# Patient Record
Sex: FEMALE
[De-identification: names, ages, dates, MRNs, and addresses within clinical notes are randomized per-mention and may not be internally consistent; named-entity substitution may affect disease eponyms.]

---

## 2023-01-04 ENCOUNTER — NURSE TRIAGE (OUTPATIENT)
Dept: OTHER | Facility: CLINIC | Age: 28
End: 2023-01-04

## 2023-01-04 NOTE — TELEPHONE ENCOUNTER
Location of patient: [place]     Subjective: Caller states \" I think I had a miscarriage on the 21st. I am bleeding so much that I am having to stay in the shower, there are a lot of clots. \"     Current Symptoms:   +unsure of due date or weeks pregnant   +abdominal cramping \"like a really bad period\"   +\"I am bleeding so much\" -more than 1 pad per 10 minutes   +first day I saw grey material \"something came out that looked weird\"   -denies dizziness, shortness of breath     Onset:   started on 12/21- then it would stop and start again \"came  back full force\"     Associated Symptoms: NA    Pain Severity: \"heavy stomach cramps\"     Temperature: Denies      What has been tried: N/A     LMP: NA Pregnant: NA    Recommended disposition: Go to ED Now    Care advice provided, patient verbalizes understanding; denies any other questions or concerns; instructed to call back for any new or worsening symptoms. Patient/caller agrees to proceed to local  Emergency Department    This triage is a result of a call to Kon[facility] robin Nurse. Please do not respond to the triage nurse through this encounter. Any subsequent communication should be directly with the patient.     Reason for Disposition   [1] SEVERE vaginal bleeding (e.g., soaking 2 pads / hour, large blood clots) AND [2] present 2 or more hours    Protocols used: Pregnancy - Vaginal Bleeding Less Than 20 Weeks EGA-ADULT-